# Patient Record
Sex: FEMALE | Race: ASIAN | NOT HISPANIC OR LATINO
[De-identification: names, ages, dates, MRNs, and addresses within clinical notes are randomized per-mention and may not be internally consistent; named-entity substitution may affect disease eponyms.]

---

## 2019-08-29 ENCOUNTER — APPOINTMENT (OUTPATIENT)
Dept: FAMILY MEDICINE | Facility: CLINIC | Age: 34
End: 2019-08-29
Payer: COMMERCIAL

## 2019-08-29 ENCOUNTER — LABORATORY RESULT (OUTPATIENT)
Age: 34
End: 2019-08-29

## 2019-08-29 VITALS
HEIGHT: 59 IN | HEART RATE: 90 BPM | BODY MASS INDEX: 17.34 KG/M2 | OXYGEN SATURATION: 100 % | SYSTOLIC BLOOD PRESSURE: 102 MMHG | DIASTOLIC BLOOD PRESSURE: 60 MMHG | WEIGHT: 86 LBS

## 2019-08-29 VITALS — DIASTOLIC BLOOD PRESSURE: 58 MMHG | SYSTOLIC BLOOD PRESSURE: 108 MMHG

## 2019-08-29 DIAGNOSIS — M54.9 DORSALGIA, UNSPECIFIED: ICD-10-CM

## 2019-08-29 PROCEDURE — 99395 PREV VISIT EST AGE 18-39: CPT | Mod: 25

## 2019-08-29 PROCEDURE — 36415 COLL VENOUS BLD VENIPUNCTURE: CPT

## 2019-08-29 NOTE — PHYSICAL EXAM
[No Acute Distress] : no acute distress [Well Nourished] : well nourished [Normal Sclera/Conjunctiva] : normal sclera/conjunctiva [Well Developed] : well developed [Well-Appearing] : well-appearing [EOMI] : extraocular movements intact [PERRL] : pupils equal round and reactive to light [No JVD] : no jugular venous distention [Normal Outer Ear/Nose] : the outer ears and nose were normal in appearance [Normal Oropharynx] : the oropharynx was normal [Supple] : supple [No Lymphadenopathy] : no lymphadenopathy [No Respiratory Distress] : no respiratory distress  [No Accessory Muscle Use] : no accessory muscle use [Thyroid Normal, No Nodules] : the thyroid was normal and there were no nodules present [Clear to Auscultation] : lungs were clear to auscultation bilaterally [Normal Rate] : normal rate  [Normal S1, S2] : normal S1 and S2 [Regular Rhythm] : with a regular rhythm [No Carotid Bruits] : no carotid bruits [No Murmur] : no murmur heard [Pedal Pulses Present] : the pedal pulses are present [No Varicosities] : no varicosities [No Abdominal Bruit] : a ~M bruit was not heard ~T in the abdomen [No Edema] : there was no peripheral edema [No Palpable Aorta] : no palpable aorta [Soft] : abdomen soft [No Extremity Clubbing/Cyanosis] : no extremity clubbing/cyanosis [Non Tender] : non-tender [Non-distended] : non-distended [Normal Bowel Sounds] : normal bowel sounds [No Masses] : no abdominal mass palpated [No HSM] : no HSM [Normal Posterior Cervical Nodes] : no posterior cervical lymphadenopathy [Normal Anterior Cervical Nodes] : no anterior cervical lymphadenopathy [No CVA Tenderness] : no CVA  tenderness [No Spinal Tenderness] : no spinal tenderness [Grossly Normal Strength/Tone] : grossly normal strength/tone [No Joint Swelling] : no joint swelling [No Rash] : no rash [Coordination Grossly Intact] : coordination grossly intact [No Focal Deficits] : no focal deficits [Normal Affect] : the affect was normal [Deep Tendon Reflexes (DTR)] : deep tendon reflexes were 2+ and symmetric [Normal Gait] : normal gait [Normal Insight/Judgement] : insight and judgment were intact

## 2019-08-29 NOTE — PLAN
[FreeTextEntry1] : - Likely muscle spasms due to heavy lifting \par - PT referral\par - Gynecologist referral \par - Strongly encouraged to follow up with GYN\par - Blood work today

## 2019-08-29 NOTE — END OF VISIT
[FreeTextEntry3] : Medical record entries made by the scribe today today, were at my direction and personally dictated to them by me, Dr. Gabriela Campoverde on Aug 29, 2019. I have reviewed the chart and agree that the record accurately reflects my personal performance of the history, physical exam, assessment, and plan.\par \par

## 2019-08-29 NOTE — REVIEW OF SYSTEMS
[Muscle Pain] : muscle pain [Back Pain] : back pain [Negative] : Heme/Lymph [de-identified] : mood swings

## 2019-08-29 NOTE — HISTORY OF PRESENT ILLNESS
[FreeTextEntry1] : pt c/o allergies \par \par  name: Mann\par  #: [id number]\par  [de-identified] : IZABELLA is a 34 year female here for f/u. Pt c/o upper back pain. She reports that when she works a lot/lifts over 5 lbs, it exacerbates the pain. She takes Motrin x1-x2 month. Believes she hurt herself at work. She reports that she has not seen GYN in 3 years. Pt reports mood swings during menstruation.

## 2019-08-29 NOTE — ADDENDUM
[FreeTextEntry1] : I, Bea Branch acting as a scribe for Dr. Gabriela Campoverde on Aug 29, 2019  at 11:35 AM\par

## 2019-10-19 ENCOUNTER — LABORATORY RESULT (OUTPATIENT)
Age: 34
End: 2019-10-19

## 2019-10-19 ENCOUNTER — APPOINTMENT (OUTPATIENT)
Dept: OBGYN | Facility: CLINIC | Age: 34
End: 2019-10-19
Payer: COMMERCIAL

## 2019-10-19 VITALS
HEIGHT: 59 IN | WEIGHT: 85 LBS | BODY MASS INDEX: 17.14 KG/M2 | DIASTOLIC BLOOD PRESSURE: 62 MMHG | SYSTOLIC BLOOD PRESSURE: 110 MMHG

## 2019-10-19 DIAGNOSIS — Z87.42 PERSONAL HISTORY OF OTHER DISEASES OF THE FEMALE GENITAL TRACT: ICD-10-CM

## 2019-10-19 DIAGNOSIS — Z00.00 ENCOUNTER FOR GENERAL ADULT MEDICAL EXAMINATION W/OUT ABNORMAL FINDINGS: ICD-10-CM

## 2019-10-19 DIAGNOSIS — Z11.3 ENCOUNTER FOR SCREENING FOR INFECTIONS WITH A PREDOMINANTLY SEXUAL MODE OF TRANSMISSION: ICD-10-CM

## 2019-10-19 DIAGNOSIS — Z78.9 OTHER SPECIFIED HEALTH STATUS: ICD-10-CM

## 2019-10-19 DIAGNOSIS — R23.2 FLUSHING: ICD-10-CM

## 2019-10-19 DIAGNOSIS — N89.8 OTHER SPECIFIED NONINFLAMMATORY DISORDERS OF VAGINA: ICD-10-CM

## 2019-10-19 DIAGNOSIS — Z80.3 FAMILY HISTORY OF MALIGNANT NEOPLASM OF BREAST: ICD-10-CM

## 2019-10-19 DIAGNOSIS — Z01.419 ENCOUNTER FOR GYNECOLOGICAL EXAMINATION (GENERAL) (ROUTINE) W/OUT ABNORMAL FINDINGS: ICD-10-CM

## 2019-10-19 PROCEDURE — 99395 PREV VISIT EST AGE 18-39: CPT

## 2019-10-19 PROCEDURE — 36415 COLL VENOUS BLD VENIPUNCTURE: CPT

## 2019-10-19 PROCEDURE — 99385 PREV VISIT NEW AGE 18-39: CPT

## 2019-10-19 NOTE — DISCUSSION/SUMMARY
[FreeTextEntry1] : Pt aware pending results any further tx.  All the pt's questions and concerns were addressed.

## 2019-10-19 NOTE — PHYSICAL EXAM
[Alert] : alert [Awake] : awake [Soft] : soft [Oriented x3] : oriented to person, place, and time [Normal] : clitoris [Labia Majora] : labia major [Labia Minora] : labia minora [Uterine Adnexae] : were not tender and not enlarged [No Bleeding] : there was no active vaginal bleeding [Acute Distress] : no acute distress [Mass] : no breast mass [Axillary LAD] : no axillary lymphadenopathy [Nipple Discharge] : no nipple discharge [Tender] : non tender [FreeTextEntry6] : normal

## 2019-10-19 NOTE — REVIEW OF SYSTEMS
[Feeling Tired] : feeling tired [Headache] : headache [Palpitations] : palpitations [Dizziness] : dizziness [Nl] : Integumentary

## 2019-10-19 NOTE — HISTORY OF PRESENT ILLNESS
[Male ___] : [unfilled] male [Sexually Active] : is sexually active [Regular Exercise] : regular exercise [Last Pap ___] : Last cervical pap smear was [unfilled] [Reproductive Age] : is of reproductive age [Contraception] : uses contraception [Definite:  ___ (Date)] : the last menstrual period was [unfilled] [Menarche Age: ____] : age at menarche was [unfilled]

## 2019-10-20 LAB
HAV IGM SER QL: NONREACTIVE
HBV CORE IGM SER QL: NONREACTIVE
HBV SURFACE AG SER QL: NONREACTIVE
HCV AB SER QL: NONREACTIVE
HCV S/CO RATIO: 0.17 S/CO
T PALLIDUM AB SER QL IA: NEGATIVE

## 2019-10-30 LAB
C TRACH RRNA SPEC QL NAA+PROBE: NOT DETECTED
CYTOLOGY CVX/VAG DOC THIN PREP: ABNORMAL
HPV HIGH+LOW RISK DNA PNL CVX: DETECTED
HSV 1+2 IGG SER IA-IMP: NEGATIVE
HSV 1+2 IGG SER IA-IMP: POSITIVE
HSV1 IGG SER QL: 0.21 INDEX
HSV2 IGG SER QL: 14.3 INDEX
N GONORRHOEA RRNA SPEC QL NAA+PROBE: NOT DETECTED
SOURCE AMPLIFICATION: NORMAL
SOURCE TP AMPLIFICATION: NORMAL
T VAGINALIS RRNA SPEC QL NAA+PROBE: NOT DETECTED

## 2019-12-14 ENCOUNTER — APPOINTMENT (OUTPATIENT)
Dept: OBGYN | Facility: CLINIC | Age: 34
End: 2019-12-14
Payer: COMMERCIAL

## 2019-12-14 VITALS
BODY MASS INDEX: 17.14 KG/M2 | DIASTOLIC BLOOD PRESSURE: 60 MMHG | SYSTOLIC BLOOD PRESSURE: 90 MMHG | HEIGHT: 59 IN | WEIGHT: 85 LBS

## 2019-12-14 DIAGNOSIS — Z13.29 ENCOUNTER FOR SCREENING FOR OTHER SUSPECTED ENDOCRINE DISORDER: ICD-10-CM

## 2019-12-14 DIAGNOSIS — B00.9 HERPESVIRAL INFECTION, UNSPECIFIED: ICD-10-CM

## 2019-12-14 DIAGNOSIS — Z13.6 ENCOUNTER FOR SCREENING FOR CARDIOVASCULAR DISORDERS: ICD-10-CM

## 2019-12-14 DIAGNOSIS — Z13.1 ENCOUNTER FOR SCREENING FOR DIABETES MELLITUS: ICD-10-CM

## 2019-12-14 PROCEDURE — 99213 OFFICE O/P EST LOW 20 MIN: CPT

## 2019-12-22 NOTE — HISTORY OF PRESENT ILLNESS
[Good] : being in good health [Regular Exercise] : regular exercise [Healthy Diet] : a healthy diet [Last Pap ___] : Last cervical pap smear was [unfilled] [No Previous Mammogram] : no previous mammogram [No Previous Colonoscopy] : no previous colonoscopy [Total Preg ___] : [unfilled] [Pregnancy History] : pregnancy history: [Full Term ___] : [unfilled] [Reproductive Age] : is of reproductive age [Living ___] : [unfilled] [Definite:  ___ (Date)] : the last menstrual period was [unfilled] [Menarche Age: ____] : age at menarche was [unfilled] [Sexually Active] : is sexually active [Monogamous] : is monogamous [Male ___] : [unfilled] male [Weight Concerns] : no concerns with her weight [Contraception] : does not use contraception

## 2019-12-22 NOTE — DISCUSSION/SUMMARY
[Condoms] : condom use [Pregnancy Prevention] : pregnancy prevention [FreeTextEntry1] : The significance of HSV II, and treatments discussed in detail.  Pt initiating suppressive therapy.  HPV reviewed, along with natural course.  All the pt's questions and concerns were addressed.

## 2020-10-30 LAB
25(OH)D3 SERPL-MCNC: 31.1 NG/ML
ALBUMIN SERPL ELPH-MCNC: 4.4 G/DL
ALP BLD-CCNC: 41 U/L
ALT SERPL-CCNC: 14 U/L
ANION GAP SERPL CALC-SCNC: 11 MMOL/L
APPEARANCE: CLEAR
AST SERPL-CCNC: 21 U/L
BACTERIA: NEGATIVE
BASOPHILS # BLD AUTO: 0.06 K/UL
BASOPHILS NFR BLD AUTO: 0.9 %
BILIRUB SERPL-MCNC: 1.1 MG/DL
BILIRUBIN URINE: NEGATIVE
BLOOD URINE: NEGATIVE
BUN SERPL-MCNC: 15 MG/DL
CALCIUM SERPL-MCNC: 9.2 MG/DL
CHLORIDE SERPL-SCNC: 105 MMOL/L
CHOLEST SERPL-MCNC: 164 MG/DL
CHOLEST/HDLC SERPL: 2.2 RATIO
CO2 SERPL-SCNC: 22 MMOL/L
COLOR: YELLOW
CREAT SERPL-MCNC: 0.55 MG/DL
EOSINOPHIL # BLD AUTO: 0.18 K/UL
EOSINOPHIL NFR BLD AUTO: 2.6 %
ESTIMATED AVERAGE GLUCOSE: 80 MG/DL
GLUCOSE QUALITATIVE U: NEGATIVE
GLUCOSE SERPL-MCNC: 88 MG/DL
HBA1C MFR BLD HPLC: 4.4 %
HCT VFR BLD CALC: 36.8 %
HDLC SERPL-MCNC: 76 MG/DL
HGB BLD-MCNC: 12.3 G/DL
HIV1+2 AB SPEC QL IA.RAPID: NONREACTIVE
HYALINE CASTS: 0 /LPF
KETONES URINE: ABNORMAL
LDLC SERPL CALC-MCNC: 81 MG/DL
LEUKOCYTE ESTERASE URINE: NEGATIVE
LYMPHOCYTES # BLD AUTO: 1.59 K/UL
LYMPHOCYTES NFR BLD AUTO: 22.8 %
MAN DIFF?: NORMAL
MCHC RBC-ENTMCNC: 22.2 PG
MCHC RBC-ENTMCNC: 33.4 GM/DL
MCV RBC AUTO: 66.5 FL
MICROSCOPIC-UA: NORMAL
MONOCYTES # BLD AUTO: 0.49 K/UL
MONOCYTES NFR BLD AUTO: 7 %
NEUTROPHILS # BLD AUTO: 4.46 K/UL
NEUTROPHILS NFR BLD AUTO: 64 %
NITRITE URINE: NEGATIVE
PH URINE: 6
PLATELET # BLD AUTO: 318 K/UL
POTASSIUM SERPL-SCNC: 4.9 MMOL/L
PROT SERPL-MCNC: 7 G/DL
PROTEIN URINE: NORMAL
RBC # BLD: 5.53 M/UL
RBC # FLD: 17.6 %
RED BLOOD CELLS URINE: 6 /HPF
SODIUM SERPL-SCNC: 138 MMOL/L
SPECIFIC GRAVITY URINE: 1.02
SQUAMOUS EPITHELIAL CELLS: 2 /HPF
TRIGL SERPL-MCNC: 37 MG/DL
TSH SERPL-ACNC: 0.84 UIU/ML
URATE SERPL-MCNC: 4.3 MG/DL
UROBILINOGEN URINE: NORMAL
WBC # FLD AUTO: 6.97 K/UL
WHITE BLOOD CELLS URINE: 0 /HPF

## 2020-12-21 PROBLEM — Z01.419 ENCOUNTER FOR ANNUAL ROUTINE GYNECOLOGICAL EXAMINATION: Status: RESOLVED | Noted: 2019-10-19 | Resolved: 2020-12-21

## 2021-09-02 ENCOUNTER — APPOINTMENT (OUTPATIENT)
Dept: FAMILY MEDICINE | Facility: CLINIC | Age: 36
End: 2021-09-02

## 2021-10-21 ENCOUNTER — APPOINTMENT (OUTPATIENT)
Dept: FAMILY MEDICINE | Facility: CLINIC | Age: 36
End: 2021-10-21
Payer: COMMERCIAL

## 2021-10-21 VITALS
TEMPERATURE: 97.2 F | WEIGHT: 85 LBS | SYSTOLIC BLOOD PRESSURE: 90 MMHG | OXYGEN SATURATION: 98 % | BODY MASS INDEX: 17.14 KG/M2 | HEIGHT: 59 IN | DIASTOLIC BLOOD PRESSURE: 52 MMHG | HEART RATE: 83 BPM

## 2021-10-21 PROCEDURE — G0442 ANNUAL ALCOHOL SCREEN 15 MIN: CPT | Mod: 59

## 2021-10-21 PROCEDURE — 99213 OFFICE O/P EST LOW 20 MIN: CPT | Mod: 25

## 2021-10-21 PROCEDURE — G0444 DEPRESSION SCREEN ANNUAL: CPT | Mod: 59

## 2021-10-21 NOTE — PLAN
[FreeTextEntry1] : \par - Advised making appointment with GYN \par - Mammogram and sonogram ordered \par - Underweight: Encouraged gaining weight \par - Advised discussing Covid-19 booster vaccine with GYN before getting pregnant

## 2021-10-21 NOTE — END OF VISIT
[FreeTextEntry3] : Medical record entries made by the scribe today today, were at my direction and personally dictated to them by me, Dr. Gabriela Campoverde on Oct 21, 2021. I have reviewed the chart and agree that the record accurately reflects my personal performance of the history, physical exam, assessment, and plan.\par

## 2021-10-21 NOTE — ADDENDUM
[FreeTextEntry1] : I, Genia Kendall acting as a scribe for Dr. Gabriela Campoverde on Oct 21, 2021  at 11:32 AM\par

## 2021-10-21 NOTE — HISTORY OF PRESENT ILLNESS
[Time Spent: ____ minutes] : Total time spent using  services: [unfilled] minutes. The patient's primary language is not English thus required  services. [FreeTextEntry1] : Follow up \par  #[phone]\par Name:Letha [Interpreters_IDNumber] : #[id number] [Interpreters_FullName] : [facility] [FreeTextEntry3] : Azeri  [de-identified] : Patient is here today because she plans on getting pregnant this year and would like to know what tests she needs. Notes she recently had blood work with another physician. States she would like to have a mammogram as well because her grandmother passed away of breast cancer. Reports normal menstrual cycle. Overall feels well today and has no acute complaints. \par

## 2021-12-16 ENCOUNTER — LABORATORY RESULT (OUTPATIENT)
Age: 36
End: 2021-12-16

## 2021-12-16 ENCOUNTER — APPOINTMENT (OUTPATIENT)
Dept: OBGYN | Facility: CLINIC | Age: 36
End: 2021-12-16
Payer: COMMERCIAL

## 2021-12-16 VITALS
HEIGHT: 59 IN | SYSTOLIC BLOOD PRESSURE: 98 MMHG | WEIGHT: 85 LBS | BODY MASS INDEX: 17.14 KG/M2 | DIASTOLIC BLOOD PRESSURE: 60 MMHG

## 2021-12-16 DIAGNOSIS — Z01.419 ENCOUNTER FOR GYNECOLOGICAL EXAMINATION (GENERAL) (ROUTINE) W/OUT ABNORMAL FINDINGS: ICD-10-CM

## 2021-12-16 DIAGNOSIS — R92.2 INCONCLUSIVE MAMMOGRAM: ICD-10-CM

## 2021-12-16 PROCEDURE — 99395 PREV VISIT EST AGE 18-39: CPT

## 2021-12-16 RX ORDER — VALACYCLOVIR 500 MG/1
500 TABLET, FILM COATED ORAL DAILY
Qty: 1 | Refills: 3 | Status: DISCONTINUED | COMMUNITY
Start: 2019-12-16 | End: 2021-12-16

## 2021-12-18 LAB
C TRACH RRNA SPEC QL NAA+PROBE: NOT DETECTED
N GONORRHOEA RRNA SPEC QL NAA+PROBE: NOT DETECTED
SOURCE AMPLIFICATION: NORMAL
SOURCE TP AMPLIFICATION: NORMAL
T VAGINALIS RRNA SPEC QL NAA+PROBE: NOT DETECTED

## 2021-12-27 NOTE — HISTORY OF PRESENT ILLNESS
[N] : Patient does not use contraception [Y] : Positive pregnancy history [Menarche Age: ____] : age at menarche was [unfilled] [No] : Patient does not have concerns regarding sex [PapSmeardate] : 10/19/19 [TextBox_31] : NEG [GonorrheaDate] : 10/19/19 [TextBox_63] : NEG [ChlamydiaDate] : 10/19/19 [HPVDate] : 10/19/19 [TextBox_78] : POS [LMPDate] : 12/3/21 [PGxTotal] : 1 [[facility]] : 1 [[facility]] : 1 [FreeTextEntry1] : 12/3/21

## 2021-12-27 NOTE — DISCUSSION/SUMMARY
[FreeTextEntry1] : Ovulation reviewed.  Pt to RTO once pregnant, if not pregnant in 6 months instructed to RTO for fertility consult/recommendations. Pt will initiate PNV.    All the pt's questions and concerns reviewed.

## 2022-03-11 ENCOUNTER — NON-APPOINTMENT (OUTPATIENT)
Age: 37
End: 2022-03-11

## 2022-03-22 LAB
CYTOLOGY CVX/VAG DOC THIN PREP: NORMAL
HPV HIGH+LOW RISK DNA PNL CVX: DETECTED

## 2022-04-15 ENCOUNTER — APPOINTMENT (OUTPATIENT)
Dept: OBGYN | Facility: CLINIC | Age: 37
End: 2022-04-15

## 2022-04-15 VITALS — WEIGHT: 85.6 LBS | HEIGHT: 59 IN | BODY MASS INDEX: 17.26 KG/M2

## 2022-04-15 LAB
HCG UR QL: NEGATIVE
QUALITY CONTROL: YES

## 2022-04-15 PROCEDURE — 57454 BX/CURETT OF CERVIX W/SCOPE: CPT

## 2022-04-15 PROCEDURE — 81025 URINE PREGNANCY TEST: CPT

## 2022-04-22 ENCOUNTER — NON-APPOINTMENT (OUTPATIENT)
Age: 37
End: 2022-04-22

## 2022-04-29 ENCOUNTER — APPOINTMENT (OUTPATIENT)
Dept: OBGYN | Facility: CLINIC | Age: 37
End: 2022-04-29
Payer: COMMERCIAL

## 2022-04-29 VITALS
BODY MASS INDEX: 16.69 KG/M2 | SYSTOLIC BLOOD PRESSURE: 90 MMHG | HEIGHT: 60 IN | WEIGHT: 85 LBS | DIASTOLIC BLOOD PRESSURE: 52 MMHG

## 2022-04-29 LAB — CORE LAB BIOPSY: NORMAL

## 2022-04-29 PROCEDURE — 99213 OFFICE O/P EST LOW 20 MIN: CPT

## 2022-04-29 NOTE — REASON FOR VISIT
[Follow-Up] : a follow-up evaluation of [[employer] ] : provided by [employer]   [FreeTextEntry2] : Follow up for colpo [Interpreters_IDNumber] : [id number] [Interpreters_FullName] : Edis [FreeTextEntry3] : Maltese [TWNoteComboBox1] : Other

## 2022-04-29 NOTE — PHYSICAL EXAM
[Chaperone Present] : A chaperone was present in the examining room during all aspects of the physical examination [Appropriately responsive] : appropriately responsive [Alert] : alert [No Acute Distress] : no acute distress [Oriented x3] : oriented x3 [Labia Majora] : normal [Labia Minora] : normal [Scant] : There was scant vaginal bleeding [Normal] : normal [FreeTextEntry1] : Medical scribjennifer HARRIS [Pink Rugae] : pink rugae

## 2022-04-29 NOTE — HISTORY OF PRESENT ILLNESS
[Gonorrhea test offered] : Gonorrhea test offered [Chlamydia test offered] : Chlamydia test offered [HPV test offered] : HPV test offered [N] : Patient does not use contraception [Y] : Positive pregnancy history [Menarche Age: ____] : age at menarche was [unfilled] [No] : Patient does not have concerns regarding sex [Currently Active] : currently active [PapSmeardate] : 12/16/2021 [TextBox_31] : Negative [GonorrheaDate] : 12/16/2021 [TextBox_63] : Negative [ChlamydiaDate] : 12/16/2021 [TextBox_68] : Negative [HPVDate] : 12/16/2021 [TextBox_78] : Positive [LMPDate] : 04/29/2022 [PGxTotal] : 1 [[facility]] : 1 [[facility]] : 1 [FreeTextEntry1] : 04/29/2022

## 2022-04-29 NOTE — DISCUSSION/SUMMARY
[FreeTextEntry1] : -Persistent HPV HR positive- s/p colposcopy on 4/15/22- She is doing well post procedure.\par 1) We reviewed her benign colposcopy pathology. Results were discussed.\par 2) Repeat pap smear in 1 year.\par \par -Follow up in 12/2022 for her annual GYN exam or PRN. \par \par All questions and concerns were discussed. Russian interpretation provided by: Edis, ID#: [id number].

## 2022-04-29 NOTE — END OF VISIT
[FreeTextEntry3] : I, Shaneka Jim solely acted as a scribe for Dr. Erlinda Frey on 04/29/2022 . All medical entries made by the scribe were at my, Dr. Frey's, direction and personally dictated by me on 04/29/2022 . I have reviewed the chart and agree that the record accurately reflects my personal performance of the history, physical exam, assessment and plan. I have also personally directed, reviewed, and agreed with the chart.

## 2022-06-23 NOTE — REASON FOR VISIT
[Follow-Up] : a follow-up evaluation of [[employer] ] : provided by [employer]   [FreeTextEntry2] : Colposcopy [Interpreters_IDNumber] : [id number] [Interpreters_FullName] : Noe [FreeTextEntry3] : Armenian [TWNoteComboBox1] : Other

## 2022-06-23 NOTE — DISCUSSION/SUMMARY
[FreeTextEntry1] : -Colposcopy done today for pap due to persistent HPV HR positive. Procedure details, r/b/a were discussed. Consent was signed. Please see procedure details above.\par \par -Post procedure expectations and call parameters were reviewed.\par \par -Follow up in 2 weeks.\par \par All questions and concerns were discussed. Lao interpretation provided by: Noe, ID#: [id number].

## 2022-06-23 NOTE — PROCEDURE
[Colposcopy] : Colposcopy  [Time out performed] : Pre-procedure time out performed.  Patient's name, date of birth and procedure confirmed. [Consent Obtained] : Consent obtained [Risks] : risks [Benefits] : benefits [Alternatives] : alternatives [Patient] : patient [Infection] : infection [Bleeding] : bleeding [Allergic Reaction] : allergic reaction [HPV High Risk] : HPV high risk [No Premedication] : no premedication [Colposcopy Adequate] : colposcopy adequate [ECC Performed] : ECC performed [No Abnormalities] : no abnormalities [Hemostasis Obtained] : Hemostasis obtained [Tolerated Well] : the patient tolerated the procedure well [Pap Performed] : pap not performed [Lesion] : lesion seen [Biopsy] : biopsy taken [de-identified] : 4 [de-identified] : Procedure: Cervical colposcopy and endocervical curetting\par \par Patient was positioned in the dorsal lithotomy position. A speculum was placed in the vagina with clear visualization of the cervix. The cervix was then cleaned using sterile saline. 5% acetic acid was applied to the cervix. Acetowhite areas were visualized at 3 and 12 o'clock of the cervix. Lugol's solution was then applied to the cervix with non-staining areas at 6 and 12 o'clock of the cervix. Cervical biopsies were obtained from 3, 6, and 12 o'clock areas of the cervix. Endocervical curetting was obtained. Good hemostasis was obtained using Monsel's solution. Patient tolerated the procedure well. No complications. [de-identified] :  3, 6, and 12 o'clock of the cervix\par Endocervical curetting [de-identified] :  3, 6, and 12 o'clock of the cervix\par Endocervical curetting [de-identified] : with Kristine's solution.

## 2022-06-23 NOTE — HISTORY OF PRESENT ILLNESS
[Menarche Age: ____] : age at menarche was [unfilled] [Currently Active] : currently active [No] : No [FreeTextEntry1] : 4/3/22

## 2022-06-23 NOTE — PHYSICAL EXAM
[Chaperone Present] : A chaperone was present in the examining room during all aspects of the physical examination [Appropriately responsive] : appropriately responsive [Alert] : alert [No Acute Distress] : no acute distress [Oriented x3] : oriented x3 [FreeTextEntry1] : GENNARO LEES [FreeTextEntry2] : BP: 110/65

## 2022-10-31 ENCOUNTER — APPOINTMENT (OUTPATIENT)
Dept: OBGYN | Facility: CLINIC | Age: 37
End: 2022-10-31

## 2022-10-31 VITALS
SYSTOLIC BLOOD PRESSURE: 100 MMHG | BODY MASS INDEX: 17.36 KG/M2 | WEIGHT: 88.4 LBS | DIASTOLIC BLOOD PRESSURE: 60 MMHG | HEIGHT: 60 IN

## 2022-10-31 DIAGNOSIS — Z13.29 ENCOUNTER FOR SCREENING FOR OTHER SUSPECTED ENDOCRINE DISORDER: ICD-10-CM

## 2022-10-31 PROCEDURE — 99213 OFFICE O/P EST LOW 20 MIN: CPT

## 2022-10-31 NOTE — HISTORY OF PRESENT ILLNESS
[FreeTextEntry1] : Italian  [id number]\par Tucker presents for infertility/preconception consultation. \par She has one previous pregnancy at age 17. \par LMP 10/19/22. Menses occur in a regular, predictable fashion about 28-30 days. \par She has been trying to conceive for the past year without success. They are having regular timed intercourse around ovulation. \par This is a new partner from her previous pregnancy. Her partner has not fathered children in the past.

## 2022-10-31 NOTE — DISCUSSION/SUMMARY
[FreeTextEntry1] : We discussed age-related decline in fertility. I advised referral to [facility] as she has been TTC for 1 year, and information for [facility] was given. \par I recommended day 3 labs to be drawn to evaluate for ovarian reserve. She will RTO for pelvic sonogram to eval uterus and ovaries. \par We discussed timed intercourse which they have been doing, and I advised to continue with regular intercourse around the time of ovulation while the fertility evaluation is on going.

## 2022-11-28 ENCOUNTER — APPOINTMENT (OUTPATIENT)
Dept: ANTEPARTUM | Facility: CLINIC | Age: 37
End: 2022-11-28

## 2022-11-28 ENCOUNTER — APPOINTMENT (OUTPATIENT)
Dept: OBGYN | Facility: CLINIC | Age: 37
End: 2022-11-28

## 2023-01-09 ENCOUNTER — APPOINTMENT (OUTPATIENT)
Dept: ANTEPARTUM | Facility: CLINIC | Age: 38
End: 2023-01-09

## 2023-01-09 ENCOUNTER — APPOINTMENT (OUTPATIENT)
Dept: OBGYN | Facility: CLINIC | Age: 38
End: 2023-01-09
Payer: COMMERCIAL

## 2023-01-09 VITALS
BODY MASS INDEX: 19.24 KG/M2 | WEIGHT: 98 LBS | SYSTOLIC BLOOD PRESSURE: 98 MMHG | DIASTOLIC BLOOD PRESSURE: 60 MMHG | HEIGHT: 60 IN

## 2023-01-09 DIAGNOSIS — N97.9 FEMALE INFERTILITY, UNSPECIFIED: ICD-10-CM

## 2023-01-09 LAB
ANTI-MUELLERIAN HORMONE: 0.1 NG/ML
ESTRADIOL SERPL-MCNC: 6 PG/ML
FSH SERPL-MCNC: 14.2 IU/L
MEV IGG FLD QL IA: 105 AU/ML
MEV IGG+IGM SER-IMP: POSITIVE
PROLACTIN SERPL-MCNC: 17.7 NG/ML
RUBV IGG FLD-ACNC: 2.4 INDEX
RUBV IGG SER-IMP: POSITIVE
TSH SERPL-ACNC: 2.26 UIU/ML
VZV AB TITR SER: POSITIVE
VZV IGG SER IF-ACNC: 779 INDEX

## 2023-01-09 PROCEDURE — 99213 OFFICE O/P EST LOW 20 MIN: CPT

## 2023-01-09 NOTE — DISCUSSION/SUMMARY
[FreeTextEntry1] : We reviewed that her bloodwork was concerning for poor ovarian reserve. This is likely the underlying etiology for her infertility. I advised her to seek care with [facility] and information for [facility] was given. I also advised her to continue to take PNV and have intercourse mid-cycle as even though her bloodwork indicates low ovarian reserve, spontaneous conception is still possible. She verbalized understanding and will make an appointment for infertility treatment. \par RTO as needed or for annual exam.

## 2023-01-09 NOTE — HISTORY OF PRESENT ILLNESS
[FreeTextEntry1] : Patient presents for follow up of infertility. Her and her partner have been TTC for over 1 year. \par She was unable to complete pelvic sonogram today. \par Day 3 FSH was 14 and AMH was 0.095. We reviewed that these values are concerning for low ovarian reserve. \par [place]  [id number]

## 2023-01-21 ENCOUNTER — APPOINTMENT (OUTPATIENT)
Dept: OBGYN | Facility: CLINIC | Age: 38
End: 2023-01-21
Payer: COMMERCIAL

## 2023-01-21 ENCOUNTER — NON-APPOINTMENT (OUTPATIENT)
Age: 38
End: 2023-01-21

## 2023-01-21 ENCOUNTER — LABORATORY RESULT (OUTPATIENT)
Age: 38
End: 2023-01-21

## 2023-01-21 VITALS
SYSTOLIC BLOOD PRESSURE: 100 MMHG | WEIGHT: 89 LBS | BODY MASS INDEX: 17.47 KG/M2 | DIASTOLIC BLOOD PRESSURE: 62 MMHG | HEIGHT: 60 IN

## 2023-01-21 DIAGNOSIS — Z31.69 ENCOUNTER FOR OTHER GENERAL COUNSELING AND ADVICE ON PROCREATION: ICD-10-CM

## 2023-01-21 DIAGNOSIS — Z12.39 ENCOUNTER FOR OTHER SCREENING FOR MALIGNANT NEOPLASM OF BREAST: ICD-10-CM

## 2023-01-21 DIAGNOSIS — Z11.51 ENCOUNTER FOR SCREENING FOR HUMAN PAPILLOMAVIRUS (HPV): ICD-10-CM

## 2023-01-21 DIAGNOSIS — Z01.419 ENCOUNTER FOR GYNECOLOGICAL EXAMINATION (GENERAL) (ROUTINE) W/OUT ABNORMAL FINDINGS: ICD-10-CM

## 2023-01-21 DIAGNOSIS — B97.7 PAPILLOMAVIRUS AS THE CAUSE OF DISEASES CLASSIFIED ELSEWHERE: ICD-10-CM

## 2023-01-21 DIAGNOSIS — Z12.4 ENCOUNTER FOR SCREENING FOR MALIGNANT NEOPLASM OF CERVIX: ICD-10-CM

## 2023-01-21 DIAGNOSIS — R63.6 UNDERWEIGHT: ICD-10-CM

## 2023-01-21 PROCEDURE — 99395 PREV VISIT EST AGE 18-39: CPT

## 2023-01-21 RX ORDER — MULTIVITAMIN,THER AND MINERALS
TABLET ORAL
Refills: 0 | Status: ACTIVE | COMMUNITY

## 2023-01-21 NOTE — HISTORY OF PRESENT ILLNESS
[Gonorrhea test offered] : Gonorrhea test offered [Chlamydia test offered] : Chlamydia test offered [HPV test offered] : HPV test offered [Menarche Age: ____] : age at menarche was [unfilled] [No] : Patient does not have concerns regarding sex [N] : Patient does not use contraception [Y] : Patient is sexually active [Currently Active] : currently active [PapSmeardate] : 12/16/21 [TextBox_31] : NEG [GonorrheaDate] : 12/16/21 [TextBox_63] : NEG [ChlamydiaDate] : 12/16/21 [TextBox_68] : NEG [HPVDate] : 12/16/21 [TextBox_78] : POS (NEG FOR HPV 16, 18/45) [PGxTotal] : 1 [LMPDate] : 1/11/23 [[facility]] : 1 [[facility]] : 1 [FreeTextEntry1] : 1/11/23

## 2023-01-21 NOTE — DISCUSSION/SUMMARY
[FreeTextEntry1] : Pt aware pending pap results any further f/u.  Pt advised rx for  will be issues for sperm count/evaluation.  Pt given [facility] contact information.  Pt to RTO if hasn't conceived in next 6 months for f/u.  All the pt's questions and concerns were addressed.

## 2023-03-03 LAB — CYTOLOGY CVX/VAG DOC THIN PREP: NORMAL

## 2023-03-08 ENCOUNTER — NON-APPOINTMENT (OUTPATIENT)
Age: 38
End: 2023-03-08

## 2023-05-17 ENCOUNTER — NON-APPOINTMENT (OUTPATIENT)
Age: 38
End: 2023-05-17

## 2023-06-19 ENCOUNTER — NON-APPOINTMENT (OUTPATIENT)
Age: 38
End: 2023-06-19

## 2023-08-11 LAB — HPV HIGH+LOW RISK DNA PNL CVX: DETECTED

## 2024-07-16 ENCOUNTER — APPOINTMENT (OUTPATIENT)
Dept: HUMAN REPRODUCTION | Facility: CLINIC | Age: 39
End: 2024-07-16
Payer: COMMERCIAL

## 2024-07-16 PROCEDURE — 99204 OFFICE O/P NEW MOD 45 MIN: CPT

## 2024-08-05 ENCOUNTER — TRANSCRIPTION ENCOUNTER (OUTPATIENT)
Age: 39
End: 2024-08-05

## 2024-08-08 ENCOUNTER — APPOINTMENT (OUTPATIENT)
Dept: FAMILY MEDICINE | Facility: CLINIC | Age: 39
End: 2024-08-08

## 2024-08-08 ENCOUNTER — NON-APPOINTMENT (OUTPATIENT)
Age: 39
End: 2024-08-08

## 2024-08-08 PROBLEM — N89.8 VAGINAL PRURITUS: Status: ACTIVE | Noted: 2024-08-08

## 2024-08-08 PROCEDURE — 93000 ELECTROCARDIOGRAM COMPLETE: CPT

## 2024-08-08 PROCEDURE — 99396 PREV VISIT EST AGE 40-64: CPT

## 2024-08-08 NOTE — HEALTH RISK ASSESSMENT
[Patient reported PAP Smear was normal] : Patient reported PAP Smear was normal [Good] : ~his/her~  mood as  good [No] : In the past 12 months have you used drugs other than those required for medical reasons? No [No falls in past year] : Patient reported no falls in the past year [0] : 2) Feeling down, depressed, or hopeless: Not at all (0) [PHQ-2 Negative - No further assessment needed] : PHQ-2 Negative - No further assessment needed [Never] : Never [With Family] : lives with family [None] : None [Employed] : employed [] :  [Feels Safe at Home] : Feels safe at home [Fully functional (bathing, dressing, toileting, transferring, walking, feeding)] : Fully functional (bathing, dressing, toileting, transferring, walking, feeding) [Fully functional (using the telephone, shopping, preparing meals, housekeeping, doing laundry, using] : Fully functional and needs no help or supervision to perform IADLs (using the telephone, shopping, preparing meals, housekeeping, doing laundry, using transportation, managing medications and managing finances) [Smoke Detector] : smoke detector [[id number]] : 0 [Change in mental status noted] : No change in mental status noted [Reports changes in hearing] : Reports no changes in hearing [Reports changes in vision] : Reports no changes in vision [Reports normal functional visual acuity (ie: able to read med bottle)] : Reports poor functional visual acuity.  [Reports changes in dental health] : Reports no changes in dental health [PapSmearDate] : 01/23

## 2024-08-08 NOTE — HISTORY OF PRESENT ILLNESS
[FreeTextEntry1] : CPE [de-identified] :  A 39 year old female presents today for a CPE. Mood is good. She had followed up with her gynecologist last around 1 year ago, would like to get pregnant. She was told she would need to a mammo, PAP smear, and bloodwork. Pt has been told she would need to follow-up with a reproductive specialist, had seen Dr. Berger for an initial consultation. She has already undergone bloodwork.  Pt is scheduled for the PAP smear screening as well. Denies direct FHx breast cancer. She has noticed the onset of itching around the outside of her genitalia. Pt is currently underweight. Her blood counts are normal. Thyroid levels are normal. She had been checked for infectious diseases, all were normal. Denies any FHx of diabetes or heart disease. Pt has noted a significant reaction to mosquito bites. Denies changes in bowel movements or urinary habits. All questions were asked and answered with the help of a .

## 2024-08-08 NOTE — PHYSICAL EXAM
[No Acute Distress] : no acute distress [Well Nourished] : well nourished [Well Developed] : well developed [Normal Sclera/Conjunctiva] : normal sclera/conjunctiva [Well-Appearing] : well-appearing [PERRL] : pupils equal round and reactive to light [EOMI] : extraocular movements intact [Normal Outer Ear/Nose] : the outer ears and nose were normal in appearance [Normal Oropharynx] : the oropharynx was normal [No JVD] : no jugular venous distention [No Lymphadenopathy] : no lymphadenopathy [Supple] : supple [Thyroid Normal, No Nodules] : the thyroid was normal and there were no nodules present [No Respiratory Distress] : no respiratory distress  [No Accessory Muscle Use] : no accessory muscle use [Clear to Auscultation] : lungs were clear to auscultation bilaterally [Normal Rate] : normal rate  [Regular Rhythm] : with a regular rhythm [No Murmur] : no murmur heard [Normal S1, S2] : normal S1 and S2 [No Carotid Bruits] : no carotid bruits [No Abdominal Bruit] : a ~M bruit was not heard ~T in the abdomen [No Varicosities] : no varicosities [Pedal Pulses Present] : the pedal pulses are present [No Edema] : there was no peripheral edema [No Palpable Aorta] : no palpable aorta [No Extremity Clubbing/Cyanosis] : no extremity clubbing/cyanosis [Soft] : abdomen soft [Non Tender] : non-tender [Non-distended] : non-distended [No Masses] : no abdominal mass palpated [No HSM] : no HSM [Normal Bowel Sounds] : normal bowel sounds [Normal Posterior Cervical Nodes] : no posterior cervical lymphadenopathy [Normal Anterior Cervical Nodes] : no anterior cervical lymphadenopathy [No CVA Tenderness] : no CVA  tenderness [No Spinal Tenderness] : no spinal tenderness [No Joint Swelling] : no joint swelling [Grossly Normal Strength/Tone] : grossly normal strength/tone [No Rash] : no rash [Coordination Grossly Intact] : coordination grossly intact [No Focal Deficits] : no focal deficits [Normal Gait] : normal gait [Deep Tendon Reflexes (DTR)] : deep tendon reflexes were 2+ and symmetric [Normal Affect] : the affect was normal [Normal Insight/Judgement] : insight and judgment were intact

## 2024-08-08 NOTE — HISTORY OF PRESENT ILLNESS
[FreeTextEntry1] : CPE [de-identified] :  A 39 year old female presents today for a CPE. Mood is good. She had followed up with her gynecologist last around 1 year ago, would like to get pregnant. She was told she would need to a mammo, PAP smear, and bloodwork. Pt has been told she would need to follow-up with a reproductive specialist, had seen Dr. Berger for an initial consultation. She has already undergone bloodwork.  Pt is scheduled for the PAP smear screening as well. Denies direct FHx breast cancer. She has noticed the onset of itching around the outside of her genitalia. Pt is currently underweight. Her blood counts are normal. Thyroid levels are normal. She had been checked for infectious diseases, all were normal. Denies any FHx of diabetes or heart disease. Pt has noted a significant reaction to mosquito bites. Denies changes in bowel movements or urinary habits. All questions were asked and answered with the help of a .

## 2024-08-08 NOTE — PLAN
[FreeTextEntry1] : -General bloodwork done.  -Will give the patient a script for a mammogram to search for signs of dense breast / breast cancer along with a sonogram to screen for breast nodules.  -Recommended she keep a food diary and increase her daily caloric intake. Also recommended she follow-up with her reproductive specialist to find a safe diet for pregnancy.  -Recommended she take an antihistamine such as Benadryl when she gets bit by a mosquito and also apply a steroid cream to the affected area.   -Will start her on an Clotrimazole cream for treatment of the vaginal itching.  -Will follow-up with her in 1 year or as needed.

## 2024-08-08 NOTE — HEALTH RISK ASSESSMENT
[Patient reported PAP Smear was normal] : Patient reported PAP Smear was normal [Good] : ~his/her~  mood as  good [No] : In the past 12 months have you used drugs other than those required for medical reasons? No [No falls in past year] : Patient reported no falls in the past year [0] : 2) Feeling down, depressed, or hopeless: Not at all (0) [PHQ-2 Negative - No further assessment needed] : PHQ-2 Negative - No further assessment needed [Never] : Never [With Family] : lives with family [None] : None [Employed] : employed [] :  [Feels Safe at Home] : Feels safe at home [Fully functional (bathing, dressing, toileting, transferring, walking, feeding)] : Fully functional (bathing, dressing, toileting, transferring, walking, feeding) [Fully functional (using the telephone, shopping, preparing meals, housekeeping, doing laundry, using] : Fully functional and needs no help or supervision to perform IADLs (using the telephone, shopping, preparing meals, housekeeping, doing laundry, using transportation, managing medications and managing finances) [Smoke Detector] : smoke detector [[id number]] : 0 [Change in mental status noted] : No change in mental status noted [Reports changes in hearing] : Reports no changes in hearing [Reports changes in vision] : Reports no changes in vision [Reports normal functional visual acuity (ie: able to read med bottle)] : Reports poor functional visual acuity.  [Reports changes in dental health] : Reports no changes in dental health [PapSmearDate] : 01/23

## 2024-08-08 NOTE — ADDENDUM
[FreeTextEntry1] : This note was written by Aravind Crystal, acting as the  for Dr. Campoverde. This note accurately reflects the work and decisions made by Dr. Campoverde.

## 2024-08-12 ENCOUNTER — APPOINTMENT (OUTPATIENT)
Age: 39
End: 2024-08-12

## 2024-08-14 ENCOUNTER — APPOINTMENT (OUTPATIENT)
Dept: HUMAN REPRODUCTION | Facility: CLINIC | Age: 39
End: 2024-08-14
Payer: COMMERCIAL

## 2024-08-14 PROCEDURE — 76830 TRANSVAGINAL US NON-OB: CPT

## 2024-08-14 PROCEDURE — 99214 OFFICE O/P EST MOD 30 MIN: CPT | Mod: 25

## 2024-08-14 PROCEDURE — 99459 PELVIC EXAMINATION: CPT

## 2024-08-14 PROCEDURE — 36415 COLL VENOUS BLD VENIPUNCTURE: CPT

## 2024-08-17 ENCOUNTER — APPOINTMENT (OUTPATIENT)
Dept: HUMAN REPRODUCTION | Facility: CLINIC | Age: 39
End: 2024-08-17
Payer: COMMERCIAL

## 2024-08-17 PROCEDURE — 36415 COLL VENOUS BLD VENIPUNCTURE: CPT

## 2024-08-19 ENCOUNTER — APPOINTMENT (OUTPATIENT)
Dept: HUMAN REPRODUCTION | Facility: CLINIC | Age: 39
End: 2024-08-19
Payer: COMMERCIAL

## 2024-08-19 PROCEDURE — 58340 CATHETER FOR HYSTEROGRAPHY: CPT

## 2024-08-19 PROCEDURE — 76831 ECHO EXAM UTERUS: CPT

## 2024-08-19 PROCEDURE — 58999I: CUSTOM

## 2024-08-25 PROBLEM — D25.9 FIBROID UTERUS: Status: ACTIVE | Noted: 2024-08-25

## 2024-08-26 ENCOUNTER — APPOINTMENT (OUTPATIENT)
Dept: ULTRASOUND IMAGING | Facility: CLINIC | Age: 39
End: 2024-08-26
Payer: COMMERCIAL

## 2024-08-26 ENCOUNTER — RESULT REVIEW (OUTPATIENT)
Age: 39
End: 2024-08-26

## 2024-08-26 ENCOUNTER — APPOINTMENT (OUTPATIENT)
Dept: MAMMOGRAPHY | Facility: CLINIC | Age: 39
End: 2024-08-26
Payer: COMMERCIAL

## 2024-08-26 ENCOUNTER — OUTPATIENT (OUTPATIENT)
Dept: OUTPATIENT SERVICES | Facility: HOSPITAL | Age: 39
LOS: 1 days | End: 2024-08-26
Payer: COMMERCIAL

## 2024-08-26 DIAGNOSIS — Z00.8 ENCOUNTER FOR OTHER GENERAL EXAMINATION: ICD-10-CM

## 2024-08-26 PROCEDURE — 76641 ULTRASOUND BREAST COMPLETE: CPT

## 2024-08-26 PROCEDURE — 77063 BREAST TOMOSYNTHESIS BI: CPT | Mod: 26

## 2024-08-26 PROCEDURE — 77063 BREAST TOMOSYNTHESIS BI: CPT

## 2024-08-26 PROCEDURE — 77067 SCR MAMMO BI INCL CAD: CPT | Mod: 26

## 2024-08-26 PROCEDURE — 76641 ULTRASOUND BREAST COMPLETE: CPT | Mod: 26,50

## 2024-08-26 PROCEDURE — 77067 SCR MAMMO BI INCL CAD: CPT

## 2024-09-11 ENCOUNTER — APPOINTMENT (OUTPATIENT)
Dept: OBGYN | Facility: CLINIC | Age: 39
End: 2024-09-11

## 2024-09-19 ENCOUNTER — APPOINTMENT (OUTPATIENT)
Dept: OBGYN | Facility: CLINIC | Age: 39
End: 2024-09-19
Payer: COMMERCIAL

## 2024-09-19 VITALS
HEIGHT: 60 IN | SYSTOLIC BLOOD PRESSURE: 94 MMHG | DIASTOLIC BLOOD PRESSURE: 48 MMHG | WEIGHT: 90 LBS | BODY MASS INDEX: 17.67 KG/M2

## 2024-09-19 DIAGNOSIS — D25.9 LEIOMYOMA OF UTERUS, UNSPECIFIED: ICD-10-CM

## 2024-09-19 PROCEDURE — 99215 OFFICE O/P EST HI 40 MIN: CPT

## 2024-09-19 NOTE — HISTORY OF PRESENT ILLNESS
[Patient reported PAP Smear was normal] : Patient reported PAP Smear was normal [Y] : Positive pregnancy history [Menarche Age: ____] : age at menarche was [unfilled] [N] : Patient is not sexually active [No] : Patient does not have concerns regarding sex [Previously active] : previously active [Men] : men [Mammogramdate] : 08/26/2024 [TextBox_19] : justin bilateral br2 [BreastSonogramDate] : 08/26/2024 [TextBox_25] : complete bilateral br2 [PapSmeardate] : 01/21/2023 [ColonoscopyDate] : n/a [HIVDate] : 08/29/2019 [TextBox_53] : neg  [SyphilisDate] : 10/19/2019 [TextBox_58] : neg  [GonorrheaDate] : 12/16/2021 [TextBox_63] : neg  [ChlamydiaDate] : 12/16/2021 [TextBox_68] : neg  [TrichomonasDate] : 12/16/2021 [TextBox_73] : neg  [HPVDate] : 01/21/2023 [TextBox_78] : detected, 16 & 18/45neg  [LMPDate] : 08/10/2024 [PGxTotal] : 1 [[facility]xF[facility]erm] : 1 [[facility] Servicesiving] : 1 [FreeTextEntry1] : 08/10/2024

## 2024-09-19 NOTE — REASON FOR VISIT
[Follow-Up] : a follow-up evaluation of [Other: ______] : provided by JONATHON [FreeTextEntry2] : surgical consult  [Interpreters_IDNumber] : [id number] [Interpreters_FullName] : Edis [FreeTextEntry3] : Lithuanian [TWNoteComboBox1] : Other

## 2024-09-19 NOTE — DISCUSSION/SUMMARY
[FreeTextEntry1] : 38 y/o P1 with infertility, seeing YADI, found to have myoma distorting endometrial cavity, presenting to discuss myomectomy today. - Discussed that on sono there is one presumed myoma but that prior to proceeding with surgery MRI is needed to confirm no presence of other myomas that need to be addressed. Discussed that if it is only the one myoma as expected or maybe a few others, plan will be for robotic myomectomy, but there is a small chance if MRI revealed many more fibroids open approach may be necessary. Pt expressed understanding. - Discussed need for  delivery after surgery and waiting period of 6 months to get pregnant or proceed with embryo transfers (discussed that egg retrievals while waiting are ok) - Discussed risks and benefits of surgery as above - Will obtain MRI, then if findings are as expected, plan to send task to book robotic assisted myomectomy. No medical clearance will be needed. May need pre-op visit and bowel prep pending MRI results. - Pt also overdue for pap smear given HPV+, encouraged she keep 10/12 scheduled annual appointment to get this done and encouraged HPV vaccine at that visit as well. Pt expressed understanding. - f/u pending MRI results  Zana Barraza MD

## 2024-09-19 NOTE — COUNSELING
[TextEntry] : Counseled patient on the pathophysiology if fibroids, their benign nature, and the symptoms they can cause including bleeding, bulk, and pain. Advised they tend to grow slowly over time then shrink after menopause. Advised that treatment if attempting to conceive are limited to myomectomy, but that if not attempting conception include hormonal management such as OCPs, progesterone, LNG IUD, or GNRH agonists/antagonists, uterine artery embolization of myoma, myomectomy, and hysterectomy. Regarding fertility, discussed that unless fibroids distort the cavity or block fallopian tubes, there is no good evidence that fibroids themselves impact the ability to become pregnant, however their presence can increase risk of PTL or PPROM later in pregnancies.  I sat down with the patient to discuss the imaging findings & her symptoms which warrant surgical intervention. I explained that she will have to wait 6 months after the surgery before she can attempt pregnancy. The options for surgical approach including open and laparoscopic with or without robotic assistance were discussed.  The differential diagnosis was discussed in detail. The indications, risks, benefits and alternatives were discussed. Including but not limited to conversion to laparotomy, bleeding, infection, injury to surrounding organs was discussed at length. Rare chance of hysterectomy. Chance of occult injury and need for future surgery. Patient expressed an understanding of the treatment rationale and her questions were answered to her apparent satisfaction. She was given written postoperative instructions and diagram of the pelvic with relevant surrounding anatomy.

## 2024-10-12 ENCOUNTER — LABORATORY RESULT (OUTPATIENT)
Age: 39
End: 2024-10-12

## 2024-10-12 ENCOUNTER — APPOINTMENT (OUTPATIENT)
Dept: OBGYN | Facility: CLINIC | Age: 39
End: 2024-10-12

## 2024-10-12 ENCOUNTER — NON-APPOINTMENT (OUTPATIENT)
Age: 39
End: 2024-10-12

## 2024-10-12 VITALS — WEIGHT: 93 LBS | HEIGHT: 60 IN | BODY MASS INDEX: 18.26 KG/M2

## 2024-10-12 DIAGNOSIS — Z01.419 ENCOUNTER FOR GYNECOLOGICAL EXAMINATION (GENERAL) (ROUTINE) W/OUT ABNORMAL FINDINGS: ICD-10-CM

## 2024-10-12 DIAGNOSIS — Z12.4 ENCOUNTER FOR SCREENING FOR MALIGNANT NEOPLASM OF CERVIX: ICD-10-CM

## 2024-10-12 DIAGNOSIS — L29.0 PRURITUS ANI: ICD-10-CM

## 2024-10-12 DIAGNOSIS — Z23 ENCOUNTER FOR IMMUNIZATION: ICD-10-CM

## 2024-10-12 DIAGNOSIS — Z12.83 ENCOUNTER FOR SCREENING FOR MALIGNANT NEOPLASM OF SKIN: ICD-10-CM

## 2024-10-12 PROCEDURE — 99396 PREV VISIT EST AGE 40-64: CPT | Mod: 25

## 2024-10-12 PROCEDURE — 90471 IMMUNIZATION ADMIN: CPT

## 2024-10-12 PROCEDURE — 90651 9VHPV VACCINE 2/3 DOSE IM: CPT

## 2024-10-12 PROCEDURE — 99459 PELVIC EXAMINATION: CPT | Mod: NC

## 2024-10-12 RX ORDER — CLOTRIMAZOLE AND BETAMETHASONE DIPROPIONATE 10; .5 MG/G; MG/G
1-0.05 CREAM TOPICAL TWICE DAILY
Qty: 1 | Refills: 0 | Status: ACTIVE | COMMUNITY
Start: 2024-10-12 | End: 1900-01-01

## 2024-10-14 ENCOUNTER — MED ADMIN CHARGE (OUTPATIENT)
Age: 39
End: 2024-10-14

## 2024-10-14 PROBLEM — Z23 NEED FOR HPV VACCINATION: Status: ACTIVE | Noted: 2024-10-14

## 2024-10-14 LAB — HPV HIGH+LOW RISK DNA PNL CVX: DETECTED

## 2024-10-17 ENCOUNTER — APPOINTMENT (OUTPATIENT)
Dept: OBGYN | Facility: CLINIC | Age: 39
End: 2024-10-17

## 2024-10-17 LAB — CYTOLOGY CVX/VAG DOC THIN PREP: ABNORMAL

## 2024-10-21 ENCOUNTER — NON-APPOINTMENT (OUTPATIENT)
Age: 39
End: 2024-10-21

## 2024-10-29 ENCOUNTER — NON-APPOINTMENT (OUTPATIENT)
Age: 39
End: 2024-10-29

## 2024-11-01 ENCOUNTER — NON-APPOINTMENT (OUTPATIENT)
Age: 39
End: 2024-11-01

## 2024-11-05 ENCOUNTER — APPOINTMENT (OUTPATIENT)
Dept: OBGYN | Facility: CLINIC | Age: 39
End: 2024-11-05
Payer: COMMERCIAL

## 2024-11-05 VITALS
BODY MASS INDEX: 18.26 KG/M2 | HEIGHT: 60 IN | WEIGHT: 93 LBS | SYSTOLIC BLOOD PRESSURE: 110 MMHG | DIASTOLIC BLOOD PRESSURE: 60 MMHG

## 2024-11-05 DIAGNOSIS — N80.03 ADENOMYOSIS OF THE UTERUS: ICD-10-CM

## 2024-11-05 DIAGNOSIS — N97.9 FEMALE INFERTILITY, UNSPECIFIED: ICD-10-CM

## 2024-11-05 PROCEDURE — 99214 OFFICE O/P EST MOD 30 MIN: CPT

## 2024-11-11 ENCOUNTER — APPOINTMENT (OUTPATIENT)
Dept: HUMAN REPRODUCTION | Facility: CLINIC | Age: 39
End: 2024-11-11
Payer: COMMERCIAL

## 2024-11-11 PROCEDURE — 99214 OFFICE O/P EST MOD 30 MIN: CPT

## 2024-11-19 ENCOUNTER — NON-APPOINTMENT (OUTPATIENT)
Age: 39
End: 2024-11-19

## 2024-11-19 ENCOUNTER — APPOINTMENT (OUTPATIENT)
Dept: OBGYN | Facility: CLINIC | Age: 39
End: 2024-11-19
Payer: COMMERCIAL

## 2024-11-19 VITALS
BODY MASS INDEX: 18.26 KG/M2 | DIASTOLIC BLOOD PRESSURE: 62 MMHG | WEIGHT: 93 LBS | HEIGHT: 60 IN | SYSTOLIC BLOOD PRESSURE: 110 MMHG

## 2024-11-19 DIAGNOSIS — R87.810 ATYPICAL SQUAMOUS CELLS OF UNDETERMINED SIGNIFICANCE ON CYTOLOGIC SMEAR OF CERVIX (ASC-US): ICD-10-CM

## 2024-11-19 DIAGNOSIS — R87.610 ATYPICAL SQUAMOUS CELLS OF UNDETERMINED SIGNIFICANCE ON CYTOLOGIC SMEAR OF CERVIX (ASC-US): ICD-10-CM

## 2024-11-19 PROCEDURE — 57454 BX/CURETT OF CERVIX W/SCOPE: CPT

## 2024-11-19 PROCEDURE — 81025 URINE PREGNANCY TEST: CPT

## 2024-11-25 LAB
HCG UR QL: NEGATIVE
QUALITY CONTROL: YES

## 2024-12-01 LAB — CORE LAB BIOPSY: NORMAL

## 2024-12-16 ENCOUNTER — APPOINTMENT (OUTPATIENT)
Dept: OBGYN | Facility: CLINIC | Age: 39
End: 2024-12-16
Payer: COMMERCIAL

## 2024-12-16 PROCEDURE — 90651 9VHPV VACCINE 2/3 DOSE IM: CPT

## 2024-12-16 PROCEDURE — 90471 IMMUNIZATION ADMIN: CPT

## 2025-04-18 ENCOUNTER — APPOINTMENT (OUTPATIENT)
Dept: OBGYN | Facility: CLINIC | Age: 40
End: 2025-04-18
Payer: COMMERCIAL

## 2025-04-18 PROCEDURE — 90651 9VHPV VACCINE 2/3 DOSE IM: CPT

## 2025-04-18 PROCEDURE — 90471 IMMUNIZATION ADMIN: CPT
